# Patient Record
Sex: FEMALE | Race: WHITE
[De-identification: names, ages, dates, MRNs, and addresses within clinical notes are randomized per-mention and may not be internally consistent; named-entity substitution may affect disease eponyms.]

---

## 2018-09-19 ENCOUNTER — HOSPITAL ENCOUNTER (OUTPATIENT)
Dept: HOSPITAL 74 - FASU | Age: 78
Discharge: HOME | End: 2018-09-19
Attending: OPHTHALMOLOGY
Payer: COMMERCIAL

## 2018-09-19 VITALS — DIASTOLIC BLOOD PRESSURE: 75 MMHG | SYSTOLIC BLOOD PRESSURE: 141 MMHG

## 2018-09-19 VITALS — BODY MASS INDEX: 29.9 KG/M2

## 2018-09-19 VITALS — TEMPERATURE: 97.8 F | HEART RATE: 84 BPM

## 2018-09-19 DIAGNOSIS — H26.8: Primary | ICD-10-CM

## 2018-09-19 PROCEDURE — 08RK3JZ REPLACEMENT OF LEFT LENS WITH SYNTHETIC SUBSTITUTE, PERCUTANEOUS APPROACH: ICD-10-PCS | Performed by: OPHTHALMOLOGY

## 2018-09-19 RX ADMIN — CYCLOPENTOLATE HYDROCHLORIDE ONE DROP: 20 SOLUTION/ DROPS OPHTHALMIC at 07:40

## 2018-09-19 RX ADMIN — CIPROFLOXACIN HYDROCHLORIDE ONE DROP: 3 SOLUTION/ DROPS OPHTHALMIC at 07:45

## 2018-09-19 RX ADMIN — PHENYLEPHRINE HYDROCHLORIDE ONE DROP: 0.25 SPRAY NASAL at 07:45

## 2018-09-19 RX ADMIN — TROPICAMIDE ONE DROP: 10 SOLUTION/ DROPS OPHTHALMIC at 07:45

## 2018-09-19 RX ADMIN — CYCLOPENTOLATE HYDROCHLORIDE ONE DROP: 20 SOLUTION/ DROPS OPHTHALMIC at 07:35

## 2018-09-19 RX ADMIN — PHENYLEPHRINE HYDROCHLORIDE ONE DROP: 0.25 SPRAY NASAL at 07:40

## 2018-09-19 RX ADMIN — CYCLOPENTOLATE HYDROCHLORIDE ONE DROP: 20 SOLUTION/ DROPS OPHTHALMIC at 07:45

## 2018-09-19 RX ADMIN — CIPROFLOXACIN HYDROCHLORIDE ONE DROP: 3 SOLUTION/ DROPS OPHTHALMIC at 07:40

## 2018-09-19 RX ADMIN — TROPICAMIDE ONE DROP: 10 SOLUTION/ DROPS OPHTHALMIC at 07:40

## 2018-09-19 RX ADMIN — TROPICAMIDE ONE DROP: 10 SOLUTION/ DROPS OPHTHALMIC at 07:35

## 2018-09-19 RX ADMIN — CIPROFLOXACIN HYDROCHLORIDE ONE DROP: 3 SOLUTION/ DROPS OPHTHALMIC at 07:35

## 2018-09-19 RX ADMIN — PHENYLEPHRINE HYDROCHLORIDE ONE DROP: 0.25 SPRAY NASAL at 07:35

## 2018-09-19 NOTE — OP
DATE OF OPERATION:  09/19/2018

 

OPERATIVE PROCEDURE:  Lens Phacoemulsification with Posterior Chamber Intraocular

Lens Placement Left Eye

 

PREOPERATIVE DIAGNOSIS:  Visually Significant Cataract of Left Eye

 

POSTOPERATIVE DIAGNOSIS:  Visually Significant Cataract of Left Eye

 

SURGEON:  Ibrahima Millan M.D.

 

ANESTHESIA:  MAC

 

ANESTHESIOLOGIST: 

 

PROCEDURE:  The patient was brought to the operating room and placed under monitored

anesthesia care by Anesthesia.  A drop of Tetracaine was then placed over the left

eye.  The patient was then prepped and draped in the usual sterile manner.  A

speculum was then placed over the left eye. The eye was then well irrigated with

copious amounts of BSS (balanced salt solution). 

 

The operating microscope was then moved into position.  A paracentesis was performed

using a 15 degree blade.  At this point 0.5 mL of 1% preservative-free lidocaine was

injected into the anterior chamber.  Amvisc plus was then injected into the anterior

chamber.  A clear corneal incision was then formed using a 2.2 mm keratome. A

capsulorrhexis was then performed in a continuous circular fashion beginning with a

cystotome, completed with an Utratas forceps. Hydrodissection was then performed

using BSS on a cannula. The phaco probe was then introduced through the corneal wound

and the cataract was removed using the phaco chop technique.  Approximately 3 seconds

of absolute phaco time was used.  The remaining cortex was then removed using

irrigation and aspiration with an I/A probe. The capsule was then filled with regular

Amvisc and the capsule was noted to be intact.  A previously selected foldable

posterior chamber intraocular lens was then injected into the capsule through the

corneal wound using a lens injector.  It was then dialed into position using a

Sinskey hook.  The Amvisc was then removed using irrigation and aspiration.  Miostat

was then injected through the paracentesis to constrict the pupil.  The paracentesis

and corneal wound were then hydrated and noted to be water tight. A drop of Maxitrol

was then placed over the eye.  The speculum was removed and clear shield was taped

over the eye. 

 

The patient tolerated the procedure well and there were no surgical complications.

The patient was asked to follow up in my office the next day.

 

 

IBRAHIMA MILLAN M.D.

 

[id number]

DD: 09/19/2018 09:18

DT: 09/19/2018 09:32

Job #:  [id number]

## 2019-12-10 ENCOUNTER — HOSPITAL ENCOUNTER (OUTPATIENT)
Dept: HOSPITAL 74 - JASU-ENDO | Age: 79
Discharge: HOME | End: 2019-12-10
Attending: INTERNAL MEDICINE
Payer: COMMERCIAL

## 2019-12-10 VITALS — DIASTOLIC BLOOD PRESSURE: 77 MMHG | SYSTOLIC BLOOD PRESSURE: 153 MMHG | HEART RATE: 72 BPM

## 2019-12-10 VITALS — BODY MASS INDEX: 31.8 KG/M2

## 2019-12-10 VITALS — TEMPERATURE: 98 F

## 2019-12-10 DIAGNOSIS — K21.0: Primary | ICD-10-CM

## 2019-12-10 PROCEDURE — 0DB48ZX EXCISION OF ESOPHAGOGASTRIC JUNCTION, VIA NATURAL OR ARTIFICIAL OPENING ENDOSCOPIC, DIAGNOSTIC: ICD-10-PCS | Performed by: INTERNAL MEDICINE

## 2019-12-11 NOTE — PATH
Surgical Pathology Report



Patient Name:  NARENDRA ESPINOSA

Accession #:  [id number]

Med. Rec. #:  [id number]                                                        

   /Age/Gender:  1940 (Age: 79) / F

Account:  [id number]                                                          

             Location: [facility]

Taken:  12/10/2019

Received:  12/10/2019

Reported:  2019

Physicians:  Bin Kennedy M.D.

  



Specimen(s) Received

 DISTAL ESOPHAGUS/GE JUNCTION 





Clinical History

GERD

Postoperative diagnosis: Esophagitis







Final Diagnosis

DISTAL ESOPHAGUS/GE JUNCTION, BIOPSY:

SQUAMOCOLUMNAR MUCOSA WITH SEVERE CHRONIC INFLAMMATION AND CHANGES OF SEVERE

REFLUX ESOPHAGITIS. NO INTESTINAL METAPLASIA OR DYSPLASIA IDENTIFIED.





***Electronically Signed***

Christine Vela M.D.





Gross Description

Received in formalin, labeled "biopsy distal esophagus" are 6 tan, irregular

portions of soft tissue ranging from 0.2-0.6 cm. in greatest dimension. The

specimens are submitted in toto in one cassette.

/12/10/2019



saudi/12/10/2019

## 2021-04-12 ENCOUNTER — HOSPITAL ENCOUNTER (OUTPATIENT)
Dept: HOSPITAL 74 - FASU-ENDO | Age: 81
Discharge: HOME | End: 2021-04-12
Attending: INTERNAL MEDICINE
Payer: COMMERCIAL

## 2021-04-12 VITALS — SYSTOLIC BLOOD PRESSURE: 124 MMHG | DIASTOLIC BLOOD PRESSURE: 76 MMHG | HEART RATE: 66 BPM

## 2021-04-12 VITALS — TEMPERATURE: 98.2 F

## 2021-04-12 VITALS — BODY MASS INDEX: 31.7 KG/M2

## 2021-04-12 DIAGNOSIS — Z12.11: Primary | ICD-10-CM

## 2021-04-12 DIAGNOSIS — K29.70: ICD-10-CM

## 2021-04-12 DIAGNOSIS — K20.90: ICD-10-CM

## 2021-04-12 DIAGNOSIS — D12.5: ICD-10-CM

## 2021-04-12 DIAGNOSIS — Z86.010: ICD-10-CM

## 2021-04-12 DIAGNOSIS — D12.2: ICD-10-CM

## 2021-04-12 DIAGNOSIS — K57.30: ICD-10-CM

## 2021-04-12 DIAGNOSIS — R12: ICD-10-CM

## 2021-04-12 PROCEDURE — 0DB38ZX EXCISION OF LOWER ESOPHAGUS, VIA NATURAL OR ARTIFICIAL OPENING ENDOSCOPIC, DIAGNOSTIC: ICD-10-PCS | Performed by: INTERNAL MEDICINE

## 2021-04-12 PROCEDURE — 0DBN8ZX EXCISION OF SIGMOID COLON, VIA NATURAL OR ARTIFICIAL OPENING ENDOSCOPIC, DIAGNOSTIC: ICD-10-PCS | Performed by: INTERNAL MEDICINE

## 2021-04-12 PROCEDURE — 0DBK8ZX EXCISION OF ASCENDING COLON, VIA NATURAL OR ARTIFICIAL OPENING ENDOSCOPIC, DIAGNOSTIC: ICD-10-PCS | Performed by: INTERNAL MEDICINE

## 2021-04-12 PROCEDURE — 0DB98ZX EXCISION OF DUODENUM, VIA NATURAL OR ARTIFICIAL OPENING ENDOSCOPIC, DIAGNOSTIC: ICD-10-PCS | Performed by: INTERNAL MEDICINE

## 2021-04-12 PROCEDURE — 0DB78ZX EXCISION OF STOMACH, PYLORUS, VIA NATURAL OR ARTIFICIAL OPENING ENDOSCOPIC, DIAGNOSTIC: ICD-10-PCS | Performed by: INTERNAL MEDICINE

## 2021-04-12 PROCEDURE — 0DB48ZX EXCISION OF ESOPHAGOGASTRIC JUNCTION, VIA NATURAL OR ARTIFICIAL OPENING ENDOSCOPIC, DIAGNOSTIC: ICD-10-PCS | Performed by: INTERNAL MEDICINE

## 2022-09-06 ENCOUNTER — APPOINTMENT (OUTPATIENT)
Dept: INTERNAL MEDICINE | Facility: CLINIC | Age: 82
End: 2022-09-06

## 2022-09-06 ENCOUNTER — NON-APPOINTMENT (OUTPATIENT)
Age: 82
End: 2022-09-06

## 2022-09-06 VITALS
HEART RATE: 72 BPM | BODY MASS INDEX: 31.34 KG/M2 | DIASTOLIC BLOOD PRESSURE: 80 MMHG | WEIGHT: 166 LBS | HEIGHT: 61 IN | SYSTOLIC BLOOD PRESSURE: 120 MMHG | OXYGEN SATURATION: 98 %

## 2022-09-06 DIAGNOSIS — R73.03 PREDIABETES.: ICD-10-CM

## 2022-09-06 DIAGNOSIS — E78.9 DISORDER OF LIPOPROTEIN METABOLISM, UNSPECIFIED: ICD-10-CM

## 2022-09-06 DIAGNOSIS — Z80.41 FAMILY HISTORY OF MALIGNANT NEOPLASM OF OVARY: ICD-10-CM

## 2022-09-06 DIAGNOSIS — Z80.42 FAMILY HISTORY OF MALIGNANT NEOPLASM OF PROSTATE: ICD-10-CM

## 2022-09-06 DIAGNOSIS — I10 ESSENTIAL (PRIMARY) HYPERTENSION: ICD-10-CM

## 2022-09-06 DIAGNOSIS — Z83.3 FAMILY HISTORY OF DIABETES MELLITUS: ICD-10-CM

## 2022-09-06 DIAGNOSIS — Z82.3 FAMILY HISTORY OF STROKE: ICD-10-CM

## 2022-09-06 DIAGNOSIS — Z82.49 FAMILY HISTORY OF ISCHEMIC HEART DISEASE AND OTHER DISEASES OF THE CIRCULATORY SYSTEM: ICD-10-CM

## 2022-09-06 DIAGNOSIS — E55.9 VITAMIN D DEFICIENCY, UNSPECIFIED: ICD-10-CM

## 2022-09-06 DIAGNOSIS — R03.0 ELEVATED BLOOD-PRESSURE READING, W/OUT DIAGNOSIS OF HYPERTENSION: ICD-10-CM

## 2022-09-06 DIAGNOSIS — Z00.00 ENCOUNTER FOR GENERAL ADULT MEDICAL EXAMINATION W/OUT ABNORMAL FINDINGS: ICD-10-CM

## 2022-09-06 PROCEDURE — G0444 DEPRESSION SCREEN ANNUAL: CPT | Mod: 59

## 2022-09-06 PROCEDURE — 99397 PER PM REEVAL EST PAT 65+ YR: CPT | Mod: GY,25

## 2022-09-06 PROCEDURE — 36415 COLL VENOUS BLD VENIPUNCTURE: CPT

## 2022-09-06 PROCEDURE — G0439: CPT

## 2022-09-13 LAB
25(OH)D3 SERPL-MCNC: 46.5 NG/ML
ALBUMIN SERPL ELPH-MCNC: 4.5 G/DL
ALP BLD-CCNC: 80 U/L
ALT SERPL-CCNC: 15 U/L
ANION GAP SERPL CALC-SCNC: 12 MMOL/L
AST SERPL-CCNC: 22 U/L
BASOPHILS # BLD AUTO: 0.07 K/UL
BASOPHILS NFR BLD AUTO: 1.7 %
BILIRUB SERPL-MCNC: 0.6 MG/DL
BUN SERPL-MCNC: 21 MG/DL
CALCIUM SERPL-MCNC: 9.4 MG/DL
CHLORIDE SERPL-SCNC: 103 MMOL/L
CHOLEST SERPL-MCNC: 222 MG/DL
CO2 SERPL-SCNC: 24 MMOL/L
CREAT SERPL-MCNC: 0.85 MG/DL
EGFR: 69 ML/MIN/1.73M2
EOSINOPHIL # BLD AUTO: 0.22 K/UL
EOSINOPHIL NFR BLD AUTO: 5.4 %
ESTIMATED AVERAGE GLUCOSE: 120 MG/DL
GLUCOSE SERPL-MCNC: 90 MG/DL
HBA1C MFR BLD HPLC: 5.8 %
HCT VFR BLD CALC: 41.8 %
HDLC SERPL-MCNC: 76 MG/DL
HGB BLD-MCNC: 13.3 G/DL
IMM GRANULOCYTES NFR BLD AUTO: 0 %
LDLC SERPL CALC-MCNC: 118 MG/DL
LYMPHOCYTES # BLD AUTO: 0.91 K/UL
LYMPHOCYTES NFR BLD AUTO: 22.2 %
MAN DIFF?: NORMAL
MCHC RBC-ENTMCNC: 31.1 PG
MCHC RBC-ENTMCNC: 31.8 GM/DL
MCV RBC AUTO: 97.9 FL
MONOCYTES # BLD AUTO: 0.4 K/UL
MONOCYTES NFR BLD AUTO: 9.8 %
NEUTROPHILS # BLD AUTO: 2.49 K/UL
NEUTROPHILS NFR BLD AUTO: 60.9 %
NONHDLC SERPL-MCNC: 146 MG/DL
PLATELET # BLD AUTO: 167 K/UL
POTASSIUM SERPL-SCNC: 4.5 MMOL/L
PROT SERPL-MCNC: 6.9 G/DL
RBC # BLD: 4.27 M/UL
RBC # FLD: 13.4 %
SODIUM SERPL-SCNC: 139 MMOL/L
T4 SERPL-MCNC: 6.6 UG/DL
TRIGL SERPL-MCNC: 138 MG/DL
TSH SERPL-ACNC: 1.43 UIU/ML
VIT B12 SERPL-MCNC: 859 PG/ML
WBC # FLD AUTO: 4.09 K/UL

## 2022-11-29 ENCOUNTER — APPOINTMENT (OUTPATIENT)
Dept: VASCULAR SURGERY | Facility: CLINIC | Age: 82
End: 2022-11-29

## 2022-11-29 VITALS — HEIGHT: 61 IN | BODY MASS INDEX: 31.15 KG/M2 | WEIGHT: 165 LBS

## 2022-11-29 DIAGNOSIS — I83.899 VARICOSE VEINS OF UNSPECIFIED LOWER EXTREMITY WITH OTHER COMPLICATIONS: ICD-10-CM

## 2022-11-29 DIAGNOSIS — R60.0 LOCALIZED EDEMA: ICD-10-CM

## 2022-11-29 DIAGNOSIS — I89.0 LYMPHEDEMA, NOT ELSEWHERE CLASSIFIED: ICD-10-CM

## 2022-11-29 PROCEDURE — 99204 OFFICE O/P NEW MOD 45 MIN: CPT

## 2022-12-01 PROBLEM — I83.899 COMPLICATED VARICOSE VEINS: Status: ACTIVE | Noted: 2022-11-29

## 2022-12-01 PROBLEM — I89.0 LYMPHEDEMA OF LIMB: Status: ACTIVE | Noted: 2022-11-29

## 2022-12-01 NOTE — PHYSICAL EXAM
[JVD] : no jugular venous distention  [Calm] : calm [de-identified] : NAD.  Awake alert and oriented. [de-identified] : Normocephalic atraumatic.  Extraocular muscles intact. [FreeTextEntry1] : 2+ bilateral carotid, radial, femoral, popliteal, DP and PT pulses.\par Bilateral lower extremity 2+ edema.\par Varicose veins absent bilaterally.\par  [de-identified] : Soft, NT, ND. No guarding. No palpable masses. No HSM [de-identified] : Bilateral lower extremity knee arthritis.  Bilateral ankle arthritis.  Otherwise normal muscle bulk and tone. [de-identified] : Bilateral lower extremity lymphedema changes with hyperpigmentation. [de-identified] : AAOx3, CN II-XII intact. Strength 5/5 in all 4 extremities. Sensation intact throughout.

## 2022-12-01 NOTE — ASSESSMENT
[FreeTextEntry1] : 82-year-old female with bilateral lower extremity severe lymphedema.  Patient presented to discuss additional treatment options.  We discussed need for complete full compliance with bilateral lower extremity compression, elevation and skin care.  Patient will resume using her lymphedema pump.  Patient will obtain repeat ultrasound to rule out venous insufficiency and reflux as an underlying cause.  Referral for lymphedema therapy was made.  Time spent 48 minutes. [Arterial/Venous Disease] : arterial/venous disease [Other: _____] : [unfilled]

## 2022-12-01 NOTE — HISTORY OF PRESENT ILLNESS
[FreeTextEntry1] : 82-year-old female with past medical history of prediabetes and hypertension presented for evaluation of bilateral lower extremity lymphedema.  Patient states lymphedema has been present for years and got progressively worse.  She is intermittently compliant with bilateral lower extremity compression with 20 to 30 mmHg compression stockings.  Leg elevation, skin care.  Patient has lymphedema pumps that she has been using in the past.  She underwent thorough evaluation.  Recently her edema increased.  She complains of heaviness and discomfort in bilateral lower extremities.  She denies ulceration.  Left leg is more symptomatic than her right.

## 2022-12-06 ENCOUNTER — APPOINTMENT (OUTPATIENT)
Dept: INTERNAL MEDICINE | Facility: CLINIC | Age: 82
End: 2022-12-06

## 2022-12-06 DIAGNOSIS — N63.0 UNSPECIFIED LUMP IN UNSPECIFIED BREAST: ICD-10-CM

## 2022-12-06 PROCEDURE — 36415 COLL VENOUS BLD VENIPUNCTURE: CPT

## 2022-12-07 ENCOUNTER — APPOINTMENT (OUTPATIENT)
Dept: INTERNAL MEDICINE | Facility: CLINIC | Age: 82
End: 2022-12-07

## 2022-12-07 ENCOUNTER — NON-APPOINTMENT (OUTPATIENT)
Age: 82
End: 2022-12-07

## 2022-12-07 VITALS
SYSTOLIC BLOOD PRESSURE: 130 MMHG | OXYGEN SATURATION: 99 % | HEART RATE: 78 BPM | HEIGHT: 61 IN | WEIGHT: 165 LBS | BODY MASS INDEX: 31.15 KG/M2 | RESPIRATION RATE: 16 BRPM | DIASTOLIC BLOOD PRESSURE: 82 MMHG

## 2022-12-07 DIAGNOSIS — M25.076: ICD-10-CM

## 2022-12-07 PROBLEM — N63.0 BREAST MASS IN FEMALE: Status: ACTIVE | Noted: 2022-12-02

## 2022-12-07 LAB
ALBUMIN SERPL ELPH-MCNC: 4.3 G/DL
ALP BLD-CCNC: 87 U/L
ALT SERPL-CCNC: 15 U/L
ANION GAP SERPL CALC-SCNC: 10 MMOL/L
AST SERPL-CCNC: 25 U/L
BILIRUB SERPL-MCNC: 0.5 MG/DL
BUN SERPL-MCNC: 21 MG/DL
CALCIUM SERPL-MCNC: 9.3 MG/DL
CHLORIDE SERPL-SCNC: 106 MMOL/L
CO2 SERPL-SCNC: 25 MMOL/L
CREAT SERPL-MCNC: 0.78 MG/DL
EGFR: 76 ML/MIN/1.73M2
GLUCOSE SERPL-MCNC: 74 MG/DL
POTASSIUM SERPL-SCNC: 4.6 MMOL/L
PROT SERPL-MCNC: 6.8 G/DL
SODIUM SERPL-SCNC: 141 MMOL/L

## 2022-12-07 PROCEDURE — 36415 COLL VENOUS BLD VENIPUNCTURE: CPT

## 2022-12-07 PROCEDURE — 93000 ELECTROCARDIOGRAM COMPLETE: CPT

## 2022-12-07 PROCEDURE — 99214 OFFICE O/P EST MOD 30 MIN: CPT | Mod: 25

## 2022-12-08 ENCOUNTER — NON-APPOINTMENT (OUTPATIENT)
Age: 82
End: 2022-12-08

## 2022-12-08 PROBLEM — M25.076: Status: ACTIVE | Noted: 2022-12-07

## 2022-12-13 NOTE — HISTORY OF PRESENT ILLNESS
[No Pertinent Cardiac History] : no history of aortic stenosis, atrial fibrillation, coronary artery disease, recent myocardial infarction, or implantable device/pacemaker [FreeTextEntry4] : Dr. Jhoana Torres CT\par \par fax is [phone]\par \par right foot hammertoe surgery \par \par \par Patient is here to obtain clearance for hammertoe surgery.  She currently feels fine and has no major complaints.  She is aware of the risks and the benefits of the surgery

## 2022-12-13 NOTE — ASSESSMENT
[FreeTextEntry4] : Patient is aware of the risks and the benefits of the surgery with this in mind she would like to proceed with the surgery patient can be medically cleared for surgery.

## 2022-12-14 LAB
BASOPHILS # BLD AUTO: 0.04 K/UL
BASOPHILS NFR BLD AUTO: 1.1 %
EOSINOPHIL # BLD AUTO: 0.1 K/UL
EOSINOPHIL NFR BLD AUTO: 2.8 %
HCT VFR BLD CALC: 38.4 %
HGB BLD-MCNC: 12.7 G/DL
IMM GRANULOCYTES NFR BLD AUTO: 0.3 %
LYMPHOCYTES # BLD AUTO: 1.04 K/UL
LYMPHOCYTES NFR BLD AUTO: 29.2 %
MAN DIFF?: NORMAL
MCHC RBC-ENTMCNC: 31.6 PG
MCHC RBC-ENTMCNC: 33.1 GM/DL
MCV RBC AUTO: 95.5 FL
MONOCYTES # BLD AUTO: 0.41 K/UL
MONOCYTES NFR BLD AUTO: 11.5 %
NEUTROPHILS # BLD AUTO: 1.96 K/UL
NEUTROPHILS NFR BLD AUTO: 55.1 %
PLATELET # BLD AUTO: 171 K/UL
RBC # BLD: 4.02 M/UL
RBC # FLD: 13.1 %
WBC # FLD AUTO: 3.56 K/UL

## 2023-03-14 ENCOUNTER — APPOINTMENT (OUTPATIENT)
Dept: INTERNAL MEDICINE | Facility: CLINIC | Age: 83
End: 2023-03-14
Payer: MEDICARE

## 2023-03-14 VITALS
SYSTOLIC BLOOD PRESSURE: 180 MMHG | DIASTOLIC BLOOD PRESSURE: 90 MMHG | OXYGEN SATURATION: 97 % | WEIGHT: 168 LBS | HEIGHT: 61 IN | BODY MASS INDEX: 31.72 KG/M2 | HEART RATE: 82 BPM

## 2023-03-14 VITALS — DIASTOLIC BLOOD PRESSURE: 92 MMHG | SYSTOLIC BLOOD PRESSURE: 160 MMHG

## 2023-03-14 DIAGNOSIS — Z01.818 ENCOUNTER FOR OTHER PREPROCEDURAL EXAMINATION: ICD-10-CM

## 2023-03-14 PROCEDURE — G0405: CPT

## 2023-03-14 PROCEDURE — 36415 COLL VENOUS BLD VENIPUNCTURE: CPT

## 2023-03-14 PROCEDURE — 99215 OFFICE O/P EST HI 40 MIN: CPT | Mod: 25

## 2023-03-15 LAB
ANION GAP SERPL CALC-SCNC: 13 MMOL/L
BASOPHILS # BLD AUTO: 0.06 K/UL
BASOPHILS NFR BLD AUTO: 1.3 %
BUN SERPL-MCNC: 21 MG/DL
CALCIUM SERPL-MCNC: 9.3 MG/DL
CHLORIDE SERPL-SCNC: 105 MMOL/L
CO2 SERPL-SCNC: 22 MMOL/L
CREAT SERPL-MCNC: 0.79 MG/DL
EGFR: 75 ML/MIN/1.73M2
EOSINOPHIL # BLD AUTO: 0.12 K/UL
EOSINOPHIL NFR BLD AUTO: 2.6 %
GLUCOSE SERPL-MCNC: 65 MG/DL
HCT VFR BLD CALC: 39.7 %
HGB BLD-MCNC: 12.8 G/DL
IMM GRANULOCYTES NFR BLD AUTO: 0.2 %
LYMPHOCYTES # BLD AUTO: 0.8 K/UL
LYMPHOCYTES NFR BLD AUTO: 17.2 %
MAN DIFF?: NORMAL
MCHC RBC-ENTMCNC: 31.1 PG
MCHC RBC-ENTMCNC: 32.2 GM/DL
MCV RBC AUTO: 96.4 FL
MONOCYTES # BLD AUTO: 0.47 K/UL
MONOCYTES NFR BLD AUTO: 10.1 %
NEUTROPHILS # BLD AUTO: 3.2 K/UL
NEUTROPHILS NFR BLD AUTO: 68.6 %
PLATELET # BLD AUTO: 154 K/UL
POTASSIUM SERPL-SCNC: 4.5 MMOL/L
RBC # BLD: 4.12 M/UL
RBC # FLD: 13 %
SODIUM SERPL-SCNC: 139 MMOL/L
WBC # FLD AUTO: 4.66 K/UL

## 2023-03-16 PROBLEM — Z01.818 PRE-OP EXAM: Status: ACTIVE | Noted: 2022-12-07

## 2023-03-16 NOTE — RESULTS/DATA
[] : results reviewed [de-identified] : Normal [de-identified] : Normal [de-identified] : Normal sinus rhythm

## 2023-03-16 NOTE — PHYSICAL EXAM
[No Acute Distress] : no acute distress [Normal Voice/Communication] : normal voice/communication [Normal Sclera/Conjunctiva] : normal sclera/conjunctiva [Normal Oropharynx] : the oropharynx was normal [No Lymphadenopathy] : no lymphadenopathy [Normal] : soft, non-tender, non-distended, no masses palpated, no HSM and normal bowel sounds [de-identified] : varicosities, significant non pitting edema left more than right with compression stocking left side

## 2023-03-16 NOTE — HISTORY OF PRESENT ILLNESS
[Good (7-10 METs)] : Good (7-10 METs) [(Patient denies any chest pain, claudication, dyspnea on exertion, orthopnea, palpitations or syncope)] : Patient denies any chest pain, claudication, dyspnea on exertion, orthopnea, palpitations or syncope [Aortic Stenosis] : no aortic stenosis [Atrial Fibrillation] : no atrial fibrillation [Coronary Artery Disease] : no coronary artery disease [Recent Myocardial Infarction] : no recent myocardial infarction [Implantable Device/Pacemaker] : no implantable device/pacemaker [Asthma] : no asthma [COPD] : no COPD [Sleep Apnea] : no sleep apnea [Smoker] : not a smoker [Family Member] : no family member with adverse anesthesia reaction/sudden death [Self] : no previous adverse anesthesia reaction [Chronic Anticoagulation] : no chronic anticoagulation [Chronic Kidney Disease] : no chronic kidney disease [Diabetes] : no diabetes [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 Bertrand  [FreeTextEntry4] : Here for preop right MTP fusion, MT shortening, PIP fusion, flexor to extensor transfer, gastroc recession. \par Pt feels well. Says bp at home sometimes high to 170 systolic, Usually normal or low BP. Saw cardio last week. To do EST next week. He suggested meds to take day of surgery if high bp. \par She has had surgeries without complications. Had difficulty waking up after colonoscopy. Pt with no excessive bleeding and no family members with bleeding.

## 2023-03-16 NOTE — REVIEW OF SYSTEMS
[Lower Ext Edema] : lower extremity edema [Fever] : no fever [Night Sweats] : no night sweats [Vision Problems] : no vision problems [Nasal Discharge] : no nasal discharge [Chest Pain] : no chest pain [Palpitations] : no palpitations [Shortness Of Breath] : no shortness of breath [Wheezing] : no wheezing [Cough] : no cough [Abdominal Pain] : no abdominal pain [Nausea] : no nausea [Diarrhea] : diarrhea [Dysuria] : no dysuria [Hematuria] : no hematuria [Frequency] : no frequency [FreeTextEntry5] : no change chronic with lymphedema  [FreeTextEntry7] : no blood in stool

## 2023-04-10 DIAGNOSIS — D72.819 DECREASED WHITE BLOOD CELL COUNT, UNSPECIFIED: ICD-10-CM

## 2023-11-29 ENCOUNTER — HOSPITAL ENCOUNTER (OUTPATIENT)
Dept: HOSPITAL 74 - FASU | Age: 83
Discharge: HOME | End: 2023-11-29
Attending: OPHTHALMOLOGY
Payer: COMMERCIAL

## 2023-11-29 VITALS — SYSTOLIC BLOOD PRESSURE: 144 MMHG | RESPIRATION RATE: 19 BRPM | DIASTOLIC BLOOD PRESSURE: 82 MMHG

## 2023-11-29 VITALS — TEMPERATURE: 97.1 F

## 2023-11-29 VITALS — HEART RATE: 77 BPM

## 2023-11-29 VITALS — BODY MASS INDEX: 31.7 KG/M2

## 2023-11-29 DIAGNOSIS — H26.8: Primary | ICD-10-CM

## 2023-11-29 PROCEDURE — 08RJ3JZ REPLACEMENT OF RIGHT LENS WITH SYNTHETIC SUBSTITUTE, PERCUTANEOUS APPROACH: ICD-10-PCS | Performed by: OPHTHALMOLOGY

## 2023-11-29 PROCEDURE — 66984 XCAPSL CTRC RMVL W/O ECP: CPT

## 2023-11-29 RX ADMIN — CYCLOPENTOLATE HYDROCHLORIDE ONE DROP: 20 SOLUTION/ DROPS OPHTHALMIC at 10:35

## 2023-11-29 RX ADMIN — CIPROFLOXACIN HYDROCHLORIDE ONE DROP: 3 SOLUTION/ DROPS OPHTHALMIC at 10:35

## 2023-11-29 RX ADMIN — CIPROFLOXACIN HYDROCHLORIDE ONE DROP: 3 SOLUTION/ DROPS OPHTHALMIC at 10:30

## 2023-11-29 RX ADMIN — TROPICAMIDE ONE DROP: 10 SOLUTION/ DROPS OPHTHALMIC at 10:25

## 2023-11-29 RX ADMIN — CYCLOPENTOLATE HYDROCHLORIDE ONE DROP: 20 SOLUTION/ DROPS OPHTHALMIC at 10:30

## 2023-11-29 RX ADMIN — CIPROFLOXACIN HYDROCHLORIDE ONE DROP: 3 SOLUTION/ DROPS OPHTHALMIC at 10:25

## 2023-11-29 RX ADMIN — PHENYLEPHRINE HYDROCHLORIDE ONE DROP: 25 SOLUTION/ DROPS OPHTHALMIC at 10:25

## 2023-11-29 RX ADMIN — TROPICAMIDE ONE DROP: 10 SOLUTION/ DROPS OPHTHALMIC at 10:30

## 2023-11-29 RX ADMIN — PHENYLEPHRINE HYDROCHLORIDE ONE DROP: 25 SOLUTION/ DROPS OPHTHALMIC at 10:35

## 2023-11-29 RX ADMIN — TROPICAMIDE ONE DROP: 10 SOLUTION/ DROPS OPHTHALMIC at 10:35

## 2023-11-29 RX ADMIN — CYCLOPENTOLATE HYDROCHLORIDE ONE DROP: 20 SOLUTION/ DROPS OPHTHALMIC at 10:25

## 2023-11-29 RX ADMIN — PHENYLEPHRINE HYDROCHLORIDE ONE DROP: 25 SOLUTION/ DROPS OPHTHALMIC at 10:30
